# Patient Record
Sex: MALE | Race: WHITE | Employment: FULL TIME | URBAN - METROPOLITAN AREA
[De-identification: names, ages, dates, MRNs, and addresses within clinical notes are randomized per-mention and may not be internally consistent; named-entity substitution may affect disease eponyms.]

---

## 2019-03-05 RX ORDER — OXYCODONE AND ACETAMINOPHEN 10; 325 MG/1; MG/1
1 TABLET ORAL EVERY 6 HOURS PRN
COMMUNITY
End: 2019-10-08 | Stop reason: ALTCHOICE

## 2019-03-05 RX ORDER — METHYLPREDNISOLONE 4 MG/1
4 TABLET ORAL DAILY
Status: ON HOLD | COMMUNITY
End: 2019-03-09

## 2019-03-05 RX ORDER — ORPHENADRINE CITRATE 100 MG/1
100 TABLET, EXTENDED RELEASE ORAL
COMMUNITY
End: 2019-10-08 | Stop reason: ALTCHOICE

## 2019-03-05 RX ORDER — GLYBURIDE 2.5 MG/1
2.5 TABLET ORAL
COMMUNITY

## 2019-03-05 RX ORDER — AMLODIPINE BESYLATE 5 MG/1
5 TABLET ORAL DAILY
COMMUNITY

## 2019-03-05 RX ORDER — ATORVASTATIN CALCIUM 80 MG/1
80 TABLET, FILM COATED ORAL NIGHTLY
Status: ON HOLD | COMMUNITY
End: 2019-03-09

## 2019-03-05 RX ORDER — CLOPIDOGREL BISULFATE 75 MG/1
75 TABLET ORAL DAILY
Status: ON HOLD | COMMUNITY
End: 2019-03-09

## 2019-03-05 RX ORDER — IRBESARTAN AND HYDROCHLOROTHIAZIDE 150; 12.5 MG/1; MG/1
1 TABLET, FILM COATED ORAL DAILY
Status: ON HOLD | COMMUNITY
End: 2019-03-09

## 2019-03-05 NOTE — H&P
[address]    PATIENT'S NAME: Mary Campos   ATTENDING PHYSICIAN: Meghan Weber M.D.    PATIENT ACCOUNT#:   [de-identified]    LOCATION:    MEDICAL RECORD #:   [id number]       YOB: 1955  ADMISSION DATE:       03/07/2019    HISTORY AND SIGNS:  Blood pressure 136/70, heart rate 72, respiratory rate 20. HEENT:  Pupils are equal and round. Sclerae are clear. Mouth without lesions. LUNGS:  Clear to auscultation. HEART:  Normal, without a murmur. ABDOMEN:  Soft.   There is no tenderness

## 2019-03-06 ENCOUNTER — APPOINTMENT (OUTPATIENT)
Dept: LAB | Facility: HOSPITAL | Age: 64
End: 2019-03-06
Attending: SURGERY
Payer: COMMERCIAL

## 2019-03-06 ENCOUNTER — LAB ENCOUNTER (OUTPATIENT)
Dept: LAB | Facility: HOSPITAL | Age: 64
End: 2019-03-06
Attending: SURGERY
Payer: COMMERCIAL

## 2019-03-06 ENCOUNTER — HOSPITAL ENCOUNTER (OUTPATIENT)
Dept: ULTRASOUND IMAGING | Facility: HOSPITAL | Age: 64
Discharge: HOME | End: 2019-03-06
Attending: SURGERY
Payer: COMMERCIAL

## 2019-03-06 DIAGNOSIS — I65.29 CAROTID STENOSIS: ICD-10-CM

## 2019-03-06 DIAGNOSIS — Z01.818 PRE-OP TESTING: ICD-10-CM

## 2019-03-06 LAB
ALBUMIN SERPL-MCNC: 3.7 G/DL (ref 3.4–5)
ALBUMIN/GLOB SERPL: 1.1 {RATIO} (ref 1–2)
ALP LIVER SERPL-CCNC: 123 U/L (ref 45–117)
ALT SERPL-CCNC: 30 U/L (ref 16–61)
ANION GAP SERPL CALC-SCNC: 7 MMOL/L (ref 0–18)
ANTIBODY SCREEN: NEGATIVE
APTT PPP: 25.5 SECONDS (ref 26.1–34.6)
AST SERPL-CCNC: 14 U/L (ref 15–37)
ATRIAL RATE: 69 BPM
BASOPHILS # BLD AUTO: 0.01 X10(3) UL (ref 0–0.2)
BASOPHILS NFR BLD AUTO: 0.1 %
BILIRUB SERPL-MCNC: 1.3 MG/DL (ref 0.1–2)
BUN BLD-MCNC: 30 MG/DL (ref 7–18)
BUN/CREAT SERPL: 20.1 (ref 10–20)
CALCIUM BLD-MCNC: 9.6 MG/DL (ref 8.5–10.1)
CHLORIDE SERPL-SCNC: 102 MMOL/L (ref 98–107)
CO2 SERPL-SCNC: 28 MMOL/L (ref 21–32)
CREAT BLD-MCNC: 1.49 MG/DL (ref 0.7–1.3)
DEPRECATED RDW RBC AUTO: 41.6 FL (ref 35.1–46.3)
EOSINOPHIL # BLD AUTO: 0.03 X10(3) UL (ref 0–0.7)
EOSINOPHIL NFR BLD AUTO: 0.3 %
ERYTHROCYTE [DISTWIDTH] IN BLOOD BY AUTOMATED COUNT: 12.7 % (ref 11–15)
GLOBULIN PLAS-MCNC: 3.5 G/DL (ref 2.8–4.4)
GLUCOSE BLD-MCNC: 215 MG/DL (ref 70–99)
HCT VFR BLD AUTO: 44.6 % (ref 39–53)
HGB BLD-MCNC: 15 G/DL (ref 13–17.5)
IMM GRANULOCYTES # BLD AUTO: 0.05 X10(3) UL (ref 0–1)
IMM GRANULOCYTES NFR BLD: 0.5 %
INR BLD: 0.95 (ref 0.9–1.1)
LYMPHOCYTES # BLD AUTO: 1.42 X10(3) UL (ref 1–4)
LYMPHOCYTES NFR BLD AUTO: 13.1 %
M PROTEIN MFR SERPL ELPH: 7.2 G/DL (ref 6.4–8.2)
MCH RBC QN AUTO: 30.1 PG (ref 26–34)
MCHC RBC AUTO-ENTMCNC: 33.6 G/DL (ref 31–37)
MCV RBC AUTO: 89.6 FL (ref 80–100)
MONOCYTES # BLD AUTO: 0.81 X10(3) UL (ref 0.1–1)
MONOCYTES NFR BLD AUTO: 7.5 %
NEUTROPHILS # BLD AUTO: 8.48 X10 (3) UL (ref 1.5–7.7)
NEUTROPHILS # BLD AUTO: 8.48 X10(3) UL (ref 1.5–7.7)
NEUTROPHILS NFR BLD AUTO: 78.5 %
OSMOLALITY SERPL CALC.SUM OF ELEC: 297 MOSM/KG (ref 275–295)
P AXIS: 36 DEGREES
P-R INTERVAL: 154 MS
PLATELET # BLD AUTO: 244 10(3)UL (ref 150–450)
POTASSIUM SERPL-SCNC: 4.5 MMOL/L (ref 3.5–5.1)
PSA SERPL DL<=0.01 NG/ML-MCNC: 13 SECONDS (ref 12.5–14.7)
Q-T INTERVAL: 388 MS
QRS DURATION: 80 MS
QTC CALCULATION (BEZET): 415 MS
R AXIS: -11 DEGREES
RBC # BLD AUTO: 4.98 X10(6)UL (ref 4.3–5.7)
RH BLOOD TYPE: POSITIVE
SODIUM SERPL-SCNC: 137 MMOL/L (ref 136–145)
T AXIS: 11 DEGREES
VENTRICULAR RATE: 69 BPM
WBC # BLD AUTO: 10.8 X10(3) UL (ref 4–11)

## 2019-03-06 PROCEDURE — 93010 ELECTROCARDIOGRAM REPORT: CPT | Performed by: INTERNAL MEDICINE

## 2019-03-06 PROCEDURE — 85025 COMPLETE CBC W/AUTO DIFF WBC: CPT

## 2019-03-06 PROCEDURE — 93880 EXTRACRANIAL BILAT STUDY: CPT | Performed by: SURGERY

## 2019-03-06 PROCEDURE — 80053 COMPREHEN METABOLIC PANEL: CPT

## 2019-03-06 PROCEDURE — 86850 RBC ANTIBODY SCREEN: CPT

## 2019-03-06 PROCEDURE — 36415 COLL VENOUS BLD VENIPUNCTURE: CPT

## 2019-03-06 PROCEDURE — 86900 BLOOD TYPING SEROLOGIC ABO: CPT

## 2019-03-06 PROCEDURE — 93005 ELECTROCARDIOGRAM TRACING: CPT

## 2019-03-06 PROCEDURE — 86901 BLOOD TYPING SEROLOGIC RH(D): CPT

## 2019-03-06 PROCEDURE — 85730 THROMBOPLASTIN TIME PARTIAL: CPT

## 2019-03-06 PROCEDURE — 85610 PROTHROMBIN TIME: CPT

## 2019-03-07 ENCOUNTER — ANESTHESIA EVENT (OUTPATIENT)
Dept: CARDIAC SURGERY | Facility: HOSPITAL | Age: 64
DRG: 039 | End: 2019-03-07
Payer: COMMERCIAL

## 2019-03-07 ENCOUNTER — HOSPITAL ENCOUNTER (INPATIENT)
Facility: HOSPITAL | Age: 64
LOS: 2 days | Discharge: HOME | DRG: 039 | End: 2019-03-09
Attending: SURGERY | Admitting: SURGERY
Payer: COMMERCIAL

## 2019-03-07 ENCOUNTER — ANESTHESIA (OUTPATIENT)
Dept: CARDIAC SURGERY | Facility: HOSPITAL | Age: 64
DRG: 039 | End: 2019-03-07
Payer: COMMERCIAL

## 2019-03-07 PROBLEM — Z98.890 S/P CAROTID ENDARTERECTOMY: Status: ACTIVE | Noted: 2019-03-07

## 2019-03-07 LAB
ANION GAP SERPL CALC-SCNC: 8 MMOL/L (ref 0–18)
BUN BLD-MCNC: 20 MG/DL (ref 7–18)
BUN/CREAT SERPL: 20.6 (ref 10–20)
CALCIUM BLD-MCNC: 7.6 MG/DL (ref 8.5–10.1)
CHLORIDE SERPL-SCNC: 107 MMOL/L (ref 98–107)
CO2 SERPL-SCNC: 25 MMOL/L (ref 21–32)
CREAT BLD-MCNC: 0.97 MG/DL (ref 0.7–1.3)
DEPRECATED RDW RBC AUTO: 40.4 FL (ref 35.1–46.3)
ERYTHROCYTE [DISTWIDTH] IN BLOOD BY AUTOMATED COUNT: 12.4 % (ref 11–15)
EST. AVERAGE GLUCOSE BLD GHB EST-MCNC: 169 MG/DL (ref 68–126)
GLUCOSE BLD-MCNC: 131 MG/DL (ref 70–99)
GLUCOSE BLD-MCNC: 143 MG/DL (ref 70–99)
GLUCOSE BLD-MCNC: 149 MG/DL (ref 70–99)
GLUCOSE BLD-MCNC: 253 MG/DL (ref 70–99)
GLUCOSE BLD-MCNC: 261 MG/DL (ref 70–99)
GLUCOSE BLD-MCNC: 276 MG/DL (ref 70–99)
HBA1C MFR BLD HPLC: 7.5 % (ref ?–5.7)
HBV SURFACE AG SER-ACNC: <0.1 [IU]/L
HBV SURFACE AG SERPL QL IA: NONREACTIVE
HCT VFR BLD AUTO: 35.8 % (ref 39–53)
HCV AB SERPL QL IA: NONREACTIVE
HGB BLD-MCNC: 12.1 G/DL (ref 13–17.5)
MCH RBC QN AUTO: 30 PG (ref 26–34)
MCHC RBC AUTO-ENTMCNC: 33.8 G/DL (ref 31–37)
MCV RBC AUTO: 88.8 FL (ref 80–100)
OSMOLALITY SERPL CALC.SUM OF ELEC: 295 MOSM/KG (ref 275–295)
PLATELET # BLD AUTO: 201 10(3)UL (ref 150–450)
POTASSIUM SERPL-SCNC: 3.7 MMOL/L (ref 3.5–5.1)
RAPID HIV: NONREACTIVE
RBC # BLD AUTO: 4.03 X10(6)UL (ref 4.3–5.7)
SODIUM SERPL-SCNC: 140 MMOL/L (ref 136–145)
WBC # BLD AUTO: 7.8 X10(3) UL (ref 4–11)

## 2019-03-07 PROCEDURE — 07B10ZX EXCISION OF RIGHT NECK LYMPHATIC, OPEN APPROACH, DIAGNOSTIC: ICD-10-PCS | Performed by: SURGERY

## 2019-03-07 PROCEDURE — 03UK07Z SUPPLEMENT RIGHT INTERNAL CAROTID ARTERY WITH AUTOLOGOUS TISSUE SUBSTITUTE, OPEN APPROACH: ICD-10-PCS | Performed by: SURGERY

## 2019-03-07 PROCEDURE — 03CK0ZZ EXTIRPATION OF MATTER FROM RIGHT INTERNAL CAROTID ARTERY, OPEN APPROACH: ICD-10-PCS | Performed by: SURGERY

## 2019-03-07 PROCEDURE — 06BP0ZZ EXCISION OF RIGHT SAPHENOUS VEIN, OPEN APPROACH: ICD-10-PCS | Performed by: SURGERY

## 2019-03-07 PROCEDURE — 99233 SBSQ HOSP IP/OBS HIGH 50: CPT | Performed by: HOSPITALIST

## 2019-03-07 RX ORDER — MEPERIDINE HYDROCHLORIDE 25 MG/ML
12.5 INJECTION INTRAMUSCULAR; INTRAVENOUS; SUBCUTANEOUS AS NEEDED
Status: DISCONTINUED | OUTPATIENT
Start: 2019-03-07 | End: 2019-03-09

## 2019-03-07 RX ORDER — HYDROCODONE BITARTRATE AND ACETAMINOPHEN 5; 325 MG/1; MG/1
2 TABLET ORAL EVERY 4 HOURS PRN
Status: DISCONTINUED | OUTPATIENT
Start: 2019-03-07 | End: 2019-03-09

## 2019-03-07 RX ORDER — ATORVASTATIN CALCIUM 80 MG/1
80 TABLET, FILM COATED ORAL NIGHTLY
Status: DISCONTINUED | OUTPATIENT
Start: 2019-03-07 | End: 2019-03-09

## 2019-03-07 RX ORDER — ASPIRIN 81 MG/1
81 TABLET, CHEWABLE ORAL DAILY
Status: DISCONTINUED | OUTPATIENT
Start: 2019-03-07 | End: 2019-03-09

## 2019-03-07 RX ORDER — BISACODYL 10 MG
10 SUPPOSITORY, RECTAL RECTAL
Status: DISCONTINUED | OUTPATIENT
Start: 2019-03-07 | End: 2019-03-09

## 2019-03-07 RX ORDER — MIDAZOLAM HYDROCHLORIDE 1 MG/ML
1 INJECTION INTRAMUSCULAR; INTRAVENOUS EVERY 5 MIN PRN
Status: ACTIVE | OUTPATIENT
Start: 2019-03-07 | End: 2019-03-07

## 2019-03-07 RX ORDER — SODIUM CHLORIDE, SODIUM LACTATE, POTASSIUM CHLORIDE, CALCIUM CHLORIDE 600; 310; 30; 20 MG/100ML; MG/100ML; MG/100ML; MG/100ML
INJECTION, SOLUTION INTRAVENOUS CONTINUOUS
Status: DISCONTINUED | OUTPATIENT
Start: 2019-03-07 | End: 2019-03-07

## 2019-03-07 RX ORDER — MORPHINE SULFATE 4 MG/ML
2 INJECTION, SOLUTION INTRAMUSCULAR; INTRAVENOUS EVERY 2 HOUR PRN
Status: DISCONTINUED | OUTPATIENT
Start: 2019-03-07 | End: 2019-03-09

## 2019-03-07 RX ORDER — IRBESARTAN AND HYDROCHLOROTHIAZIDE 150; 12.5 MG/1; MG/1
1 TABLET, FILM COATED ORAL DAILY
Status: DISCONTINUED | OUTPATIENT
Start: 2019-03-07 | End: 2019-03-07 | Stop reason: SDUPTHER

## 2019-03-07 RX ORDER — CEFAZOLIN SODIUM/WATER 2 G/20 ML
2 SYRINGE (ML) INTRAVENOUS EVERY 8 HOURS
Status: COMPLETED | OUTPATIENT
Start: 2019-03-07 | End: 2019-03-08

## 2019-03-07 RX ORDER — HYDROCODONE BITARTRATE AND ACETAMINOPHEN 5; 325 MG/1; MG/1
1 TABLET ORAL EVERY 4 HOURS PRN
Status: DISCONTINUED | OUTPATIENT
Start: 2019-03-07 | End: 2019-03-09

## 2019-03-07 RX ORDER — CLOPIDOGREL BISULFATE 75 MG/1
75 TABLET ORAL DAILY
Status: DISCONTINUED | OUTPATIENT
Start: 2019-03-07 | End: 2019-03-07

## 2019-03-07 RX ORDER — NITROGLYCERIN 20 MG/100ML
INJECTION INTRAVENOUS CONTINUOUS
Status: DISCONTINUED | OUTPATIENT
Start: 2019-03-07 | End: 2019-03-09

## 2019-03-07 RX ORDER — ACETAMINOPHEN 500 MG
1000 TABLET ORAL ONCE AS NEEDED
Status: COMPLETED | OUTPATIENT
Start: 2019-03-07 | End: 2019-03-07

## 2019-03-07 RX ORDER — DEXTROSE MONOHYDRATE 25 G/50ML
50 INJECTION, SOLUTION INTRAVENOUS
Status: DISCONTINUED | OUTPATIENT
Start: 2019-03-07 | End: 2019-03-09

## 2019-03-07 RX ORDER — HYDROMORPHONE HYDROCHLORIDE 1 MG/ML
0.4 INJECTION, SOLUTION INTRAMUSCULAR; INTRAVENOUS; SUBCUTANEOUS EVERY 5 MIN PRN
Status: ACTIVE | OUTPATIENT
Start: 2019-03-07 | End: 2019-03-07

## 2019-03-07 RX ORDER — ASPIRIN 81 MG/1
81 TABLET, CHEWABLE ORAL DAILY
Status: DISCONTINUED | OUTPATIENT
Start: 2019-03-07 | End: 2019-03-07

## 2019-03-07 RX ORDER — DEXAMETHASONE SODIUM PHOSPHATE 4 MG/ML
4 VIAL (ML) INJECTION AS NEEDED
Status: ACTIVE | OUTPATIENT
Start: 2019-03-07 | End: 2019-03-07

## 2019-03-07 RX ORDER — CLOPIDOGREL BISULFATE 75 MG/1
75 TABLET ORAL DAILY
Status: DISCONTINUED | OUTPATIENT
Start: 2019-03-07 | End: 2019-03-09

## 2019-03-07 RX ORDER — MORPHINE SULFATE 4 MG/ML
1 INJECTION, SOLUTION INTRAMUSCULAR; INTRAVENOUS EVERY 2 HOUR PRN
Status: DISCONTINUED | OUTPATIENT
Start: 2019-03-07 | End: 2019-03-09

## 2019-03-07 RX ORDER — AMLODIPINE BESYLATE 5 MG/1
5 TABLET ORAL DAILY
Status: DISCONTINUED | OUTPATIENT
Start: 2019-03-08 | End: 2019-03-08

## 2019-03-07 RX ORDER — SODIUM CHLORIDE, SODIUM LACTATE, POTASSIUM CHLORIDE, CALCIUM CHLORIDE 600; 310; 30; 20 MG/100ML; MG/100ML; MG/100ML; MG/100ML
INJECTION, SOLUTION INTRAVENOUS CONTINUOUS
Status: DISCONTINUED | OUTPATIENT
Start: 2019-03-07 | End: 2019-03-09

## 2019-03-07 RX ORDER — DOCUSATE SODIUM 100 MG/1
100 CAPSULE, LIQUID FILLED ORAL 2 TIMES DAILY
Status: DISCONTINUED | OUTPATIENT
Start: 2019-03-07 | End: 2019-03-09

## 2019-03-07 RX ORDER — ONDANSETRON 2 MG/ML
4 INJECTION INTRAMUSCULAR; INTRAVENOUS EVERY 6 HOURS PRN
Status: DISCONTINUED | OUTPATIENT
Start: 2019-03-07 | End: 2019-03-09

## 2019-03-07 RX ORDER — METHYLPREDNISOLONE 4 MG/1
4 TABLET ORAL DAILY
Status: DISCONTINUED | OUTPATIENT
Start: 2019-03-07 | End: 2019-03-07

## 2019-03-07 RX ORDER — ONDANSETRON 2 MG/ML
4 INJECTION INTRAMUSCULAR; INTRAVENOUS AS NEEDED
Status: ACTIVE | OUTPATIENT
Start: 2019-03-07 | End: 2019-03-07

## 2019-03-07 RX ORDER — NALOXONE HYDROCHLORIDE 0.4 MG/ML
80 INJECTION, SOLUTION INTRAMUSCULAR; INTRAVENOUS; SUBCUTANEOUS AS NEEDED
Status: ACTIVE | OUTPATIENT
Start: 2019-03-07 | End: 2019-03-07

## 2019-03-07 RX ORDER — MORPHINE SULFATE 4 MG/ML
4 INJECTION, SOLUTION INTRAMUSCULAR; INTRAVENOUS EVERY 2 HOUR PRN
Status: DISCONTINUED | OUTPATIENT
Start: 2019-03-07 | End: 2019-03-09

## 2019-03-07 RX ORDER — ACETAMINOPHEN 325 MG/1
650 TABLET ORAL EVERY 4 HOURS PRN
Status: DISCONTINUED | OUTPATIENT
Start: 2019-03-07 | End: 2019-03-09

## 2019-03-07 NOTE — ANESTHESIA PREPROCEDURE EVALUATION
PRE-OP EVALUATION    Patient Name: Sim Hernandez    Pre-op Diagnosis: RIGHT CEREBRAL VASCULAR ACCIDENT, STROKE    Procedure(s):  right carotid endarterectomy with vein patch. harvesting of right saphenous vein.      Surgeon(s) and Role:     * Vaishnavi Rodriguez Intravenous PRN       Outpatient Medications:    Clopidogrel Bisulfate 75 MG Oral Tab Take 75 mg by mouth daily. Disp:  Rfl:    aspirin 81 MG Oral Tab Take 81 mg by mouth daily.  Disp:  Rfl:    SITagliptin-metFORMIN HCl ER (JANUMET XR) 100-1000 MG Oral Tabl HCT 44.6 03/06/2019    MCV 89.6 03/06/2019    MCH 30.1 03/06/2019    MCHC 33.6 03/06/2019    RDW 12.7 03/06/2019    .0 03/06/2019     Lab Results   Component Value Date     03/06/2019    K 4.5 03/06/2019     03/06/2019    CO2 28.0 03/06/

## 2019-03-07 NOTE — PLAN OF CARE
Assumed care of pt @ 1045 from [place] [address]. Pts neck dressing with shadow drainage, leg incision open to air, skin glue, no drainage. Neurologically intact, no facial droop. A&Ox4. Neck incision oozing slowly, Dr. Victoria Pacheco aware.  Dressing changed by Dr. Victoria Pacheco at be

## 2019-03-07 NOTE — CONSULTS
[facility]  CONSULT     Sim Mary Patient Status:  Inpatient    3/12/1955 MRN [id number]   [facility] 6NE-A Attending Darlene Harper MD   Hosp Day # 0 PCP Nicolle Early     Reason for consult: DM   Requested by: Dr. Dina Vanegas  MG Oral Tab Take 1 tablet by mouth every 6 (six) hours as needed for Pain. Disp:  Rfl:    methylPREDNISolone 4 MG Oral Tab Take 4 mg by mouth daily.  Disp:  Rfl:        Physical Exam:    /71   Pulse 58   Temp 97.1 °F (36.2 °C) (Temporal)   Resp full  Plan of care discussed with patient   Jeana Zepeda MD  3/7/2019

## 2019-03-07 NOTE — OPERATIVE REPORT
Pre-op Dx: Bilateral carotid stenosis- TIA right side. Post-op Dx: Same with subplaque hemorrhage. Procedure: Right CEA/vein patch/Romeo shunt.  Surgeon : Eulogio/ Asst: Petey Beard

## 2019-03-07 NOTE — OPERATIVE REPORT
[facility]    PATIENT'S NAME: Stewart Paul   ATTENDING PHYSICIAN: Terry Ellington M.D. OPERATING PHYSICIAN: Terry Ellington M.D.    PATIENT ACCOUNT#:   [de-identified]    LOCATION:  [facility] 1 ED  MEDICAL RECORD #:   [id number]       DATE OF BIRTH was used to cover the operative field. The patient had ultrasound done prior to procedure to demonstrate the saphenous vein.   The patient, as previously stated, received preoperative antibiotics, and Tim catheter was placed by Surgery, art line and IV b through a severely stenotic ulcerative sub-plaque hemorrhage, into the internal carotid beyond; there was greater than 50% stenosis by visual imaging.   The patient had a heparinized-filled Romeo shunt inserted first distally, with adequate backbleeding, an thyroidal artery. Vital signs remained stable. Once hemostasis was obtained, the wounds were closed with 2-0 Vicryl in the platysma, 3-0 Vicryl subcuticular. The area was anesthetized with approximately 20 mL of 0.25% Marcaine plain.   Estimated blood lo

## 2019-03-07 NOTE — ANESTHESIA POSTPROCEDURE EVALUATION
[address] Patient Status:  Inpatient   Age/Gender 66year old male MRN [id number]   [facility] 6NE-A Attending Radha Jason MD   Hosp Day # 0 PCP VERO BRADLEY       Anesthesia Post-op Note    Procedure(s):  ri

## 2019-03-07 NOTE — CONSULTS
[facility]  Cardiology Consultation    Roslynn Nyhan Patient Status:  Inpatient    3/12/1955 MRN [id number]   [facility] 6NE-A Attending Judy Britt MD   Hosp Day # 0 PCP Mary Lopez     Reason for Consultation: Q2H PRN  •  aspirin chewable tab 81 mg, 81 mg, Oral, Daily  •  docusate sodium (COLACE) cap 100 mg, 100 mg, Oral, BID  •  bisacodyl (DULCOLAX) rectal suppository 10 mg, 10 mg, Rectal, Daily PRN  •  ceFAZolin sodium (ANCEF/KEFZOL) 2 GM/20ML premix IV syring review of systems was negative if not otherwise mention in above HPI.     /60 (BP Location: Right arm)   Pulse 55   Temp 98.2 °F (36.8 °C) (Temporal)   Resp 19   Ht 6' (1.829 m)   Wt 201 lb 3 oz (91.3 kg)   SpO2 93%   BMI 27.29 kg/m²   Temp (24hrs), A patient.     Wero Godinez MD  3/7/2019  5:49 PM

## 2019-03-08 LAB
ANION GAP SERPL CALC-SCNC: 5 MMOL/L (ref 0–18)
ATRIAL RATE: 71 BPM
BUN BLD-MCNC: 15 MG/DL (ref 7–18)
BUN/CREAT SERPL: 18.8 (ref 10–20)
CALCIUM BLD-MCNC: 7.8 MG/DL (ref 8.5–10.1)
CHLORIDE SERPL-SCNC: 107 MMOL/L (ref 98–107)
CO2 SERPL-SCNC: 27 MMOL/L (ref 21–32)
CREAT BLD-MCNC: 0.8 MG/DL (ref 0.7–1.3)
DEPRECATED RDW RBC AUTO: 41.1 FL (ref 35.1–46.3)
ERYTHROCYTE [DISTWIDTH] IN BLOOD BY AUTOMATED COUNT: 12.5 % (ref 11–15)
GLUCOSE BLD-MCNC: 136 MG/DL (ref 70–99)
GLUCOSE BLD-MCNC: 154 MG/DL (ref 70–99)
GLUCOSE BLD-MCNC: 163 MG/DL (ref 70–99)
GLUCOSE BLD-MCNC: 177 MG/DL (ref 70–99)
GLUCOSE BLD-MCNC: 192 MG/DL (ref 70–99)
GLUCOSE BLD-MCNC: 226 MG/DL (ref 70–99)
HCT VFR BLD AUTO: 35.1 % (ref 39–53)
HGB BLD-MCNC: 11.7 G/DL (ref 13–17.5)
MCH RBC QN AUTO: 29.7 PG (ref 26–34)
MCHC RBC AUTO-ENTMCNC: 33.3 G/DL (ref 31–37)
MCV RBC AUTO: 89.1 FL (ref 80–100)
OSMOLALITY SERPL CALC.SUM OF ELEC: 292 MOSM/KG (ref 275–295)
P AXIS: 48 DEGREES
P-R INTERVAL: 168 MS
PLATELET # BLD AUTO: 192 10(3)UL (ref 150–450)
POTASSIUM SERPL-SCNC: 3.8 MMOL/L (ref 3.5–5.1)
Q-T INTERVAL: 412 MS
QRS DURATION: 82 MS
QTC CALCULATION (BEZET): 447 MS
R AXIS: 5 DEGREES
RBC # BLD AUTO: 3.94 X10(6)UL (ref 4.3–5.7)
SODIUM SERPL-SCNC: 139 MMOL/L (ref 136–145)
T AXIS: -1 DEGREES
VENTRICULAR RATE: 71 BPM
WBC # BLD AUTO: 11.2 X10(3) UL (ref 4–11)

## 2019-03-08 PROCEDURE — 99232 SBSQ HOSP IP/OBS MODERATE 35: CPT | Performed by: HOSPITALIST

## 2019-03-08 RX ORDER — POTASSIUM CHLORIDE 20 MEQ/1
40 TABLET, EXTENDED RELEASE ORAL ONCE
Status: COMPLETED | OUTPATIENT
Start: 2019-03-08 | End: 2019-03-08

## 2019-03-08 NOTE — PROGRESS NOTES
[facility]  Progress Note     Roslynn Nyhan Patient Status:  Inpatient    3/12/1955 MRN [id number]   [facility] 7NE-A Attending Judy Britt MD   Hosp Day # 1 PCP Mary Lopez     Chief Complaint: CEA  S:  Patient denie mg Oral BID   • atorvastatin  80 mg Oral Nightly   • Clopidogrel Bisulfate  75 mg Oral Daily   • Insulin Aspart Pen  1-10 Units Subcutaneous TID AC and HS     ASSESSMENT / PLAN:   1. S/p CEA  1. Per Dr. Yanique Cotto  2. ASA, plavix   3. Neurologically intact   2.

## 2019-03-08 NOTE — PROGRESS NOTES
[facility]  Progress Note    Lindsey Garcia Patient Status:  Inpatient    3/12/1955 MRN [id number]   [facility] 6NE-A Attending Julian Cifuentes MD   Hosp Day # 1 PCP VERO BRALDEY       Subjective:  Up in chair eating breakfast. only)   Oral Daily   • Insulin Aspart Pen  1-10 Units Subcutaneous TID AC and HS     • Nitroglycerin in D5W     • lactated ringers 125 mL/hr at 03/07/19 2011       Assessment:  · POD 1 s/p right CEA 3/7/19  · TIA  · HTN-BP marginal, hold ARB/hctz today  ·

## 2019-03-08 NOTE — PROGRESS NOTES
No complaints. Neurologically intact. Wounds clean/dry. Blood pressure stable- p0lan discharge tomorrow.  Discussed wound care/activity/ follow up

## 2019-03-08 NOTE — PROGRESS NOTES
Transferred from CCU.    Vital signs stable   Dressing to right neck C/D/I- changed x1 for scant oozing   Pain to right groin slightly improved with [address] for DC tomorrow

## 2019-03-09 VITALS
SYSTOLIC BLOOD PRESSURE: 148 MMHG | WEIGHT: 205.5 LBS | HEART RATE: 77 BPM | OXYGEN SATURATION: 97 % | BODY MASS INDEX: 27.83 KG/M2 | RESPIRATION RATE: 16 BRPM | TEMPERATURE: 98 F | DIASTOLIC BLOOD PRESSURE: 72 MMHG | HEIGHT: 72 IN

## 2019-03-09 LAB
GLUCOSE BLD-MCNC: 132 MG/DL (ref 70–99)
POTASSIUM SERPL-SCNC: 4.3 MMOL/L (ref 3.5–5.1)

## 2019-03-09 PROCEDURE — 99232 SBSQ HOSP IP/OBS MODERATE 35: CPT | Performed by: HOSPITALIST

## 2019-03-09 RX ORDER — CLOPIDOGREL BISULFATE 75 MG/1
75 TABLET ORAL DAILY
Qty: 30 TABLET | Refills: 0 | Status: SHIPPED | OUTPATIENT
Start: 2019-03-09

## 2019-03-09 RX ORDER — ATORVASTATIN CALCIUM 80 MG/1
80 TABLET, FILM COATED ORAL NIGHTLY
Qty: 30 TABLET | Refills: 0 | Status: SHIPPED | OUTPATIENT
Start: 2019-03-09

## 2019-03-09 NOTE — PLAN OF CARE
NURSING DISCHARGE NOTE    Discharged Home via Wheelchair. Accompanied by Friend  Belongings Taken by patient/family.       Patient educated on medications, s/s to monitor for, follow up appointments, wound care, discharge instructions per kaia Hatfield

## 2019-03-09 NOTE — PROGRESS NOTES
No complaints- neurologically intact, wound clean/dry. Instructed post-op wound care/activity/ follow up.

## 2019-03-09 NOTE — PROGRESS NOTES
[facility]  Progress Note     Caesar Shira Patient Status:  Inpatient    3/12/1955 MRN [id number]   [facility] 7NE-A Attending Itzel Camarillo MD   Hosp Day # 2 PCP Sandra Douglas     Chief Complaint: CEA  S:  Patient denie Epic.  Medications:   • aspirin  81 mg Oral Daily   • docusate sodium  100 mg Oral BID   • atorvastatin  80 mg Oral Nightly   • Clopidogrel Bisulfate  75 mg Oral Daily   • Insulin Aspart Pen  1-10 Units Subcutaneous TID AC and HS     ASSESSMENT / PLAN:   1

## 2019-03-09 NOTE — PLAN OF CARE
CARDIOVASCULAR - ADULT    • Maintains optimal cardiac output and hemodynamic stability Adequate for Discharge    • Absence of cardiac arrhythmias or at baseline Adequate for Discharge        Diabetes/Glucose Control    • Glucose maintained within presc

## 2019-03-09 NOTE — PLAN OF CARE
Assumed pt care at 2330. Pt resting in bed in no apparent distress. Rt neck slightly swollen w/ drsg in place c/d/i. Medicated for pain w/Norco prn with adequate relief.VSS. SBP maintained within set parematers. IVF infusing as ordered. All safety precautions

## 2019-03-09 NOTE — PLAN OF CARE
Assumed care of patient at 1900. Alert and oriented x4. Pain to right neck and right leg incisions, norco provided as ordered. Dressing CDI. Room air with clear lung sounds, SR on tele. IVF infusing as ordered. Call light within reach.  Report endorsed to n

## 2019-03-09 NOTE — PROGRESS NOTES
[facility] Cardiology Progress Note    Patient seen and examined.  Chart reviewed.  Discussed with family at bedside. No chest pain or shortness of breath. Mild discomfort at surgical site/jaw. /76 (BP Location: Left arm)   Pulse 55   Temp 98 °F (36. MG/ML injection 0.4 mg 0.4 mg Intravenous Q5 Min PRN   [] Atropine Sulfate 0.1 MG/ML injection 0.5 mg 0.5 mg Intravenous PRN   [] Naloxone HCl (NARCAN) 0.4 MG/ML injection 80 mcg 80 mcg Intravenous PRN   [COMPLETED] acetaminophen (TYLENOL EXT

## 2019-03-11 LAB — BLOOD TYPE BARCODE: 7300

## 2019-03-11 NOTE — DISCHARGE SUMMARY
[address]    PATIENT'S NAME: Nory Means   ATTENDING PHYSICIAN: Pierre Mcardle, M.D.    PATIENT ACCOUNT#:   [de-identified]    LOCATION:  [address] [place] [address]  MEDICAL RECORD #:   [id number]       YOB: 1955  ADMISSION DATE:       03/07/20

## 2019-03-11 NOTE — DISCHARGE SUMMARY
[address]    PATIENT'S NAME: Jennifer Clayreyes   ATTENDING PHYSICIAN: Emile Harper M.D.    PATIENT ACCOUNT#:   [de-identified]    LOCATION:  [address] [place] [address]  MEDICAL RECORD #:   [id number]       YOB: 1955  ADMISSION DATE:       03/07/20

## 2019-03-19 DIAGNOSIS — Z98.890 HISTORY OF CEA (CAROTID ENDARTERECTOMY): Primary | ICD-10-CM

## 2019-03-19 DIAGNOSIS — I65.21 OCCLUSION OF RIGHT CAROTID ARTERY: ICD-10-CM

## 2019-03-25 ENCOUNTER — HOSPITAL ENCOUNTER (OUTPATIENT)
Dept: CARDIOLOGY CLINIC | Facility: HOSPITAL | Age: 64
Discharge: HOME | End: 2019-03-25
Attending: SURGERY

## 2019-03-25 DIAGNOSIS — I65.21 ARTERIOSCLEROSIS OF RIGHT CAROTID ARTERY: ICD-10-CM

## 2019-03-25 DIAGNOSIS — Z98.890 HISTORY OF CEA (CAROTID ENDARTERECTOMY): ICD-10-CM

## 2019-03-25 PROCEDURE — 93880 EXTRACRANIAL BILAT STUDY: CPT | Performed by: INTERNAL MEDICINE

## 2019-10-10 NOTE — H&P
[address]    PATIENT'S NAME: Irvin Reese   ATTENDING PHYSICIAN: Modesto Bailey M.D.    PATIENT ACCOUNT#:   [de-identified]    LOCATION:    MEDICAL RECORD #:   [id number]       YOB: 1955  ADMISSION DATE:       10/14/2019    HISTORY A hematemesis. No bright red blood per rectum. No hematuria, dysuria, hemoptysis, cough, or sputum production. No weight loss. No fever, chills. PHYSICAL EXAMINATION:    GENERAL:  He is awake, alert. He is appropriate. He is in no acute distress. M.D.  d: 10/10/2019 00:58:23  t: 10/10/2019 05:01:25  [facility] [id number]  DDY/

## 2019-10-11 ENCOUNTER — APPOINTMENT (OUTPATIENT)
Dept: LAB | Facility: HOSPITAL | Age: 64
End: 2019-10-11
Attending: SURGERY
Payer: COMMERCIAL

## 2019-10-11 ENCOUNTER — LAB ENCOUNTER (OUTPATIENT)
Dept: LAB | Facility: HOSPITAL | Age: 64
End: 2019-10-11
Attending: SURGERY
Payer: COMMERCIAL

## 2019-10-11 DIAGNOSIS — Z01.818 PRE-OP TESTING: ICD-10-CM

## 2019-10-11 PROCEDURE — 87081 CULTURE SCREEN ONLY: CPT

## 2019-10-11 PROCEDURE — 80053 COMPREHEN METABOLIC PANEL: CPT

## 2019-10-11 PROCEDURE — 86900 BLOOD TYPING SEROLOGIC ABO: CPT

## 2019-10-11 PROCEDURE — 85025 COMPLETE CBC W/AUTO DIFF WBC: CPT

## 2019-10-11 PROCEDURE — 93010 ELECTROCARDIOGRAM REPORT: CPT | Performed by: INTERNAL MEDICINE

## 2019-10-11 PROCEDURE — 85730 THROMBOPLASTIN TIME PARTIAL: CPT

## 2019-10-11 PROCEDURE — 86850 RBC ANTIBODY SCREEN: CPT

## 2019-10-11 PROCEDURE — 93005 ELECTROCARDIOGRAM TRACING: CPT

## 2019-10-11 PROCEDURE — 85610 PROTHROMBIN TIME: CPT

## 2019-10-11 PROCEDURE — 86901 BLOOD TYPING SEROLOGIC RH(D): CPT

## 2019-10-11 PROCEDURE — 36415 COLL VENOUS BLD VENIPUNCTURE: CPT

## 2019-10-14 ENCOUNTER — ANESTHESIA (OUTPATIENT)
Dept: CARDIAC SURGERY | Facility: HOSPITAL | Age: 64
DRG: 038 | End: 2019-10-14
Payer: COMMERCIAL

## 2019-10-14 ENCOUNTER — HOSPITAL ENCOUNTER (INPATIENT)
Facility: HOSPITAL | Age: 64
LOS: 1 days | Discharge: HOME | DRG: 038 | End: 2019-10-15
Attending: SURGERY | Admitting: SURGERY
Payer: COMMERCIAL

## 2019-10-14 ENCOUNTER — ANESTHESIA EVENT (OUTPATIENT)
Dept: CARDIAC SURGERY | Facility: HOSPITAL | Age: 64
DRG: 038 | End: 2019-10-14
Payer: COMMERCIAL

## 2019-10-14 PROCEDURE — 03UN07Z SUPPLEMENT LEFT EXTERNAL CAROTID ARTERY WITH AUTOLOGOUS TISSUE SUBSTITUTE, OPEN APPROACH: ICD-10-PCS | Performed by: SURGERY

## 2019-10-14 PROCEDURE — 99254 IP/OBS CNSLTJ NEW/EST MOD 60: CPT | Performed by: INTERNAL MEDICINE

## 2019-10-14 PROCEDURE — 03CL0ZZ EXTIRPATION OF MATTER FROM LEFT INTERNAL CAROTID ARTERY, OPEN APPROACH: ICD-10-PCS | Performed by: SURGERY

## 2019-10-14 PROCEDURE — 03CN0ZZ EXTIRPATION OF MATTER FROM LEFT EXTERNAL CAROTID ARTERY, OPEN APPROACH: ICD-10-PCS | Performed by: SURGERY

## 2019-10-14 PROCEDURE — 07B20ZZ EXCISION OF LEFT NECK LYMPHATIC, OPEN APPROACH: ICD-10-PCS | Performed by: SURGERY

## 2019-10-14 PROCEDURE — 99253 IP/OBS CNSLTJ NEW/EST LOW 45: CPT | Performed by: INTERNAL MEDICINE

## 2019-10-14 PROCEDURE — 03UL07Z SUPPLEMENT LEFT INTERNAL CAROTID ARTERY WITH AUTOLOGOUS TISSUE SUBSTITUTE, OPEN APPROACH: ICD-10-PCS | Performed by: SURGERY

## 2019-10-14 RX ORDER — MORPHINE SULFATE 2 MG/ML
1 INJECTION, SOLUTION INTRAMUSCULAR; INTRAVENOUS EVERY 2 HOUR PRN
Status: DISCONTINUED | OUTPATIENT
Start: 2019-10-14 | End: 2019-10-15

## 2019-10-14 RX ORDER — HYDROMORPHONE HYDROCHLORIDE 1 MG/ML
0.4 INJECTION, SOLUTION INTRAMUSCULAR; INTRAVENOUS; SUBCUTANEOUS EVERY 5 MIN PRN
Status: ACTIVE | OUTPATIENT
Start: 2019-10-14 | End: 2019-10-14

## 2019-10-14 RX ORDER — ASPIRIN 81 MG/1
81 TABLET ORAL DAILY
Status: DISCONTINUED | OUTPATIENT
Start: 2019-10-14 | End: 2019-10-15

## 2019-10-14 RX ORDER — CEFAZOLIN SODIUM/WATER 2 G/20 ML
2 SYRINGE (ML) INTRAVENOUS EVERY 8 HOURS
Status: COMPLETED | OUTPATIENT
Start: 2019-10-14 | End: 2019-10-15

## 2019-10-14 RX ORDER — SODIUM CHLORIDE, SODIUM LACTATE, POTASSIUM CHLORIDE, CALCIUM CHLORIDE 600; 310; 30; 20 MG/100ML; MG/100ML; MG/100ML; MG/100ML
INJECTION, SOLUTION INTRAVENOUS CONTINUOUS
Status: DISCONTINUED | OUTPATIENT
Start: 2019-10-14 | End: 2019-10-15

## 2019-10-14 RX ORDER — CEFAZOLIN SODIUM/WATER 2 G/20 ML
SYRINGE (ML) INTRAVENOUS
Status: DISCONTINUED | OUTPATIENT
Start: 2019-10-14 | End: 2019-10-14 | Stop reason: HOSPADM

## 2019-10-14 RX ORDER — DOCUSATE SODIUM 100 MG/1
100 CAPSULE, LIQUID FILLED ORAL 2 TIMES DAILY
Status: DISCONTINUED | OUTPATIENT
Start: 2019-10-14 | End: 2019-10-15

## 2019-10-14 RX ORDER — BISACODYL 10 MG
10 SUPPOSITORY, RECTAL RECTAL
Status: DISCONTINUED | OUTPATIENT
Start: 2019-10-14 | End: 2019-10-15

## 2019-10-14 RX ORDER — ONDANSETRON 2 MG/ML
4 INJECTION INTRAMUSCULAR; INTRAVENOUS EVERY 6 HOURS PRN
Status: DISCONTINUED | OUTPATIENT
Start: 2019-10-14 | End: 2019-10-15

## 2019-10-14 RX ORDER — METOCLOPRAMIDE HYDROCHLORIDE 5 MG/ML
10 INJECTION INTRAMUSCULAR; INTRAVENOUS AS NEEDED
Status: ACTIVE | OUTPATIENT
Start: 2019-10-14 | End: 2019-10-14

## 2019-10-14 RX ORDER — MORPHINE SULFATE 2 MG/ML
4 INJECTION, SOLUTION INTRAMUSCULAR; INTRAVENOUS EVERY 2 HOUR PRN
Status: DISCONTINUED | OUTPATIENT
Start: 2019-10-14 | End: 2019-10-15

## 2019-10-14 RX ORDER — HYDROCODONE BITARTRATE AND ACETAMINOPHEN 5; 325 MG/1; MG/1
1 TABLET ORAL EVERY 4 HOURS PRN
Status: DISCONTINUED | OUTPATIENT
Start: 2019-10-14 | End: 2019-10-15

## 2019-10-14 RX ORDER — ACETAMINOPHEN 325 MG/1
650 TABLET ORAL EVERY 4 HOURS PRN
Status: DISCONTINUED | OUTPATIENT
Start: 2019-10-14 | End: 2019-10-15

## 2019-10-14 RX ORDER — ATORVASTATIN CALCIUM 80 MG/1
80 TABLET, FILM COATED ORAL NIGHTLY
Status: DISCONTINUED | OUTPATIENT
Start: 2019-10-14 | End: 2019-10-15

## 2019-10-14 RX ORDER — CLOPIDOGREL BISULFATE 75 MG/1
75 TABLET ORAL DAILY
Status: DISCONTINUED | OUTPATIENT
Start: 2019-10-14 | End: 2019-10-14

## 2019-10-14 RX ORDER — AMLODIPINE BESYLATE 5 MG/1
5 TABLET ORAL DAILY
Status: DISCONTINUED | OUTPATIENT
Start: 2019-10-15 | End: 2019-10-15

## 2019-10-14 RX ORDER — MEPERIDINE HYDROCHLORIDE 25 MG/ML
12.5 INJECTION INTRAMUSCULAR; INTRAVENOUS; SUBCUTANEOUS AS NEEDED
Status: DISCONTINUED | OUTPATIENT
Start: 2019-10-14 | End: 2019-10-15

## 2019-10-14 RX ORDER — CLOPIDOGREL BISULFATE 75 MG/1
75 TABLET ORAL DAILY
Status: DISCONTINUED | OUTPATIENT
Start: 2019-10-14 | End: 2019-10-15

## 2019-10-14 RX ORDER — DEXTROSE MONOHYDRATE 25 G/50ML
50 INJECTION, SOLUTION INTRAVENOUS
Status: DISCONTINUED | OUTPATIENT
Start: 2019-10-14 | End: 2019-10-15

## 2019-10-14 RX ORDER — MORPHINE SULFATE 2 MG/ML
2 INJECTION, SOLUTION INTRAMUSCULAR; INTRAVENOUS EVERY 2 HOUR PRN
Status: DISCONTINUED | OUTPATIENT
Start: 2019-10-14 | End: 2019-10-15

## 2019-10-14 RX ORDER — HYDROCODONE BITARTRATE AND ACETAMINOPHEN 5; 325 MG/1; MG/1
2 TABLET ORAL EVERY 4 HOURS PRN
Status: DISCONTINUED | OUTPATIENT
Start: 2019-10-14 | End: 2019-10-15

## 2019-10-14 RX ORDER — AMOXICILLIN 500 MG/1
500 CAPSULE ORAL EVERY 8 HOURS
Status: DISCONTINUED | OUTPATIENT
Start: 2019-10-15 | End: 2019-10-15

## 2019-10-14 RX ORDER — DEXTROSE MONOHYDRATE 25 G/50ML
50 INJECTION, SOLUTION INTRAVENOUS
Status: DISCONTINUED | OUTPATIENT
Start: 2019-10-14 | End: 2019-10-14

## 2019-10-14 RX ORDER — NALOXONE HYDROCHLORIDE 0.4 MG/ML
80 INJECTION, SOLUTION INTRAMUSCULAR; INTRAVENOUS; SUBCUTANEOUS AS NEEDED
Status: ACTIVE | OUTPATIENT
Start: 2019-10-14 | End: 2019-10-14

## 2019-10-14 RX ORDER — MIDAZOLAM HYDROCHLORIDE 1 MG/ML
1 INJECTION INTRAMUSCULAR; INTRAVENOUS EVERY 5 MIN PRN
Status: ACTIVE | OUTPATIENT
Start: 2019-10-14 | End: 2019-10-14

## 2019-10-14 RX ORDER — NITROGLYCERIN 20 MG/100ML
INJECTION INTRAVENOUS CONTINUOUS
Status: DISCONTINUED | OUTPATIENT
Start: 2019-10-14 | End: 2019-10-15

## 2019-10-14 RX ORDER — BUPIVACAINE HYDROCHLORIDE 5 MG/ML
INJECTION, SOLUTION EPIDURAL; INTRACAUDAL AS NEEDED
Status: DISCONTINUED | OUTPATIENT
Start: 2019-10-14 | End: 2019-10-14 | Stop reason: HOSPADM

## 2019-10-14 RX ORDER — ONDANSETRON 2 MG/ML
4 INJECTION INTRAMUSCULAR; INTRAVENOUS AS NEEDED
Status: ACTIVE | OUTPATIENT
Start: 2019-10-14 | End: 2019-10-14

## 2019-10-14 NOTE — CONSULTS
[facility]  Report of Consultation    Elida Duran Patient Status:  Inpatient    3/12/1955 MRN [id number]   [facility] 6NE-A Attending Brittnee Cho MD   Hosp Day # 0 PCP Shelly Turner     Reason for Consultation:  Cardiac Metoclopramide HCl (REGLAN) injection 10 mg, 10 mg, Intravenous, PRN  •  Meperidine HCl (DEMEROL) 25 MG/ML injection 12.5 mg, 12.5 mg, Intravenous, PRN  •  Midazolam HCl (VERSED) 2 MG/2ML injection 1 mg, 1 mg, Intravenous, Q5 Min PRN  •  nitroGLYCERIN infu Subcutaneous, Nightly  •  Insulin Aspart Pen (NOVOLOG) 100 UNIT/ML flexpen 1-5 Units, 1-5 Units, Subcutaneous, TID CC and HS    Review of Systems:  All systems were reviewed and are negative except as described above in HPI.     Physical Exam:  Blood pressu PM  Patient seen and examined. Note reviewed and labs reviewed. Agree. Last LDL from 2016 was 120 mg/dl. Will need to recheck and considering that patient has been on Atorvastatin 80 mg, rule out familial hypercholesterolemia.   ECG reviewed and normal. F

## 2019-10-14 NOTE — CONSULTS
INFECTIOUS DISEASE CONSULTATION    Lonn Later Patient Status:  Inpatient    3/12/1955 MRN [id number]   [facility] 6NE-A Attending Darlene Harper MD   Hosp Day # 0 PCP Glen BARAJAS 50mg in D5W 250ml, 5-400 mcg/min, Intravenous, Continuous  •  ondansetron HCl (ZOFRAN) injection 4 mg, 4 mg, Intravenous, Q6H PRN  •  acetaminophen (TYLENOL) tab 650 mg, 650 mg, Oral, Q4H PRN **OR** HYDROcodone-acetaminophen (NORCO) 5-325 MG per tab 1 tabl Disp: 30 tablet, Rfl: 0  atorvastatin 80 MG Oral Tab, Take 1 tablet (80 mg total) by mouth nightly., Disp: 30 tablet, Rfl: 0  Clopidogrel Bisulfate 75 MG Oral Tab, Take 1 tablet (75 mg total) by mouth daily. , Disp: 30 tablet, Rfl: 0  SITagliptin-metFORMIN Component Value Date    [facility]             Problem list reviewed:  Patient Active Problem List:     Essential hypertension     Dyslipidemia     Diabetes mellitus type 2 in nonobese (HCC)     S/P carotid endarterectomy      ASSESSMENT/PLAN:

## 2019-10-14 NOTE — ANESTHESIA PREPROCEDURE EVALUATION
PRE-OP EVALUATION    Patient Name: Louise Costello    Pre-op Diagnosis: carotid stenosis    Procedure(s):  left carotid endartectomy with vein patch  [place] Dec    Surgeon(s) and Role:     Aylin Moss MD - Primary    Pre-op vitals reviewed. Frequency: 2-4 times a month      Drinks per session: 1 or 2      Binge frequency: Never      Comment: 1 drink per week      Drug use: No     Available pre-op labs reviewed.   Lab Results   Component Value Date    WBC 6.7 10/11/2019    RBC 4.69 10/11/2019

## 2019-10-14 NOTE — PROGRESS NOTES
Comes in this morning by himself for carotid surgery. No new complaints, just had a tooth pulled with sutures and taking amoxicillin and norco.  Dr. Victoria Pacheco is aware. Neuro intact. Pt had Right carotid done before. Call light in reach.

## 2019-10-14 NOTE — OPERATIVE REPORT
Pre-op Dx: Progressive left carotid stenosis. Post-op Dx: Same with ulcerative lesion and plaque thrombus. Procerdure: Left CEA/ right GSV patch, shunt. Surgeon: Eulogio/ Asst: Radha Berg.  EBL- 300cc/ 1300cc crstalloid

## 2019-10-14 NOTE — CONSULTS
[facility]  CONSULT     Osbaldo Delgado Patient Status:  Inpatient    3/12/1955 MRN [id number]   [facility] 6NE-A Attending Magdy Parikh MD   Hosp Day # 0 PCP Adarsh Magaña     Reason for consult: medical management point review of systems was completed. Pertinent positives and negatives noted in the HPI.     Physical Exam:    BP (!) 146/99   Pulse 84   Temp 97.6 °F (36.4 °C) (Temporal)   Resp 14   Ht 6' (1.829 m)   Wt 209 lb 5 oz (94.9 kg)   SpO2 93%   BMI 28.39 kg Prophylaxis: SCD  · CODE status: full  · Tim: yes post op    Plan of care discussed with patient.      Nikki Kerr MD  10/14/2019

## 2019-10-14 NOTE — ANESTHESIA POSTPROCEDURE EVALUATION
[address] [facility] Patient Status:  Inpatient   Age/Gender 60year old male MRN [id number]   [facility] 6NE-A Attending Isaac Eller MD   Hosp Day # 0 PCP VERO BRADLEY       Anesthesia Post-op Note    Procedure(s):

## 2019-10-15 VITALS
TEMPERATURE: 98 F | HEIGHT: 72 IN | OXYGEN SATURATION: 99 % | WEIGHT: 209.13 LBS | DIASTOLIC BLOOD PRESSURE: 79 MMHG | HEART RATE: 60 BPM | RESPIRATION RATE: 16 BRPM | SYSTOLIC BLOOD PRESSURE: 136 MMHG | BODY MASS INDEX: 28.33 KG/M2

## 2019-10-15 PROCEDURE — 99232 SBSQ HOSP IP/OBS MODERATE 35: CPT | Performed by: INTERNAL MEDICINE

## 2019-10-15 RX ORDER — AMOXICILLIN 500 MG/1
500 CAPSULE ORAL EVERY 8 HOURS
Qty: 9 CAPSULE | Refills: 0 | Status: SHIPPED | OUTPATIENT
Start: 2019-10-15 | End: 2019-10-18

## 2019-10-15 NOTE — PROGRESS NOTES
Patient stable POD 1. He has been cleared by Dr Martha Peterson and medically stable for DC. Med rec completed.      Kelin Borges MD  [facility]

## 2019-10-15 NOTE — PROGRESS NOTES
[facility]  Progress Note    Daniel Peterson Patient Status:  Inpatient    3/12/1955 MRN [id number]   [facility] 6NE-A Attending Kamila Sy MD   Hosp Day # 1 PCP VERO BRADLEY       Subjective:  No acute issues overnight.  P Insulin Aspart Pen  1-5 Units Subcutaneous TID CC and HS   • amoxicillin  500 mg Oral Q8H     • lactated ringers     • Nitroglycerin in D5W Stopped (10/14/19 1415)   • lactated ringers 125 mL/hr at 10/15/19 0400       Assessment:      1.  Carotid artery dis

## 2019-10-15 NOTE — PLAN OF CARE
Assumed care of patient this a.m. @ 8907. Patient is up in chair, A/O x4. VSS. Patient c/o mild pain to left neck incision but declines any pain meds. Dr. Denzel Huddleston @ bedside. Neck and leg dressings removed.  Per Dr. Denzel Huddleston, patient may be D/C'd later this after

## 2019-10-15 NOTE — PLAN OF CARE
Pt received resting in bed. Pt awake, a/o x4. Neurologically intact. Left neck dressing intact. Small amount of serosanguinous drainage noted on dressing. No swelling noted. Pt without difficulty swallowing. No shortness of breath or dyspnea noted.  Blood p

## 2019-10-15 NOTE — OPERATIVE REPORT
[address]    PATIENT'S NAME: Stephanie Sharma   ATTENDING PHYSICIAN: Ariel Padilla M.D. OPERATING PHYSICIAN: Ariel Padilla M.D.    PATIENT ACCOUNT#:   [de-identified]    LOCATION:  [address] [place] [address]  MEDICAL RECORD #:   [id number]       DATE OF BIRTH management versus balloon angioplasty and stent were also explained. Patient also had a recent evaluation by an endodontist and had removal of tooth that was causing a tooth abscess, placed on antibiotics before and after.   He is continued on his Plavix beyond all disease. Common carotid identified deep in the neck proximal to all disease. A Terry retractor had been used for exposure.     The patient again received a bolus of heparin after greater than a 5-minute period of time with the blood pressure areas were irrigated with heparinized saline solution and suctioned clean. The remaining portion of the anastomosis was completed. Backbleeding was allowed from the internal carotid artery to flush out all the air.     With completion of the anastomosis

## 2019-10-17 NOTE — DISCHARGE SUMMARY
[address]    PATIENT'S NAME: Emma Saini   ATTENDING PHYSICIAN: Eugenio Prado M.D.    PATIENT ACCOUNT#:   [de-identified]    LOCATION:  [address] [place] [address]  MEDICAL RECORD #:   [id number]       YOB: 1955  ADMISSION DATE:       10/14/

## 2019-10-30 DIAGNOSIS — Z98.890 S/P CAROTID ENDARTERECTOMY: Primary | ICD-10-CM

## 2019-10-30 DIAGNOSIS — I65.21 STENOSIS OF RIGHT CAROTID ARTERY: ICD-10-CM

## 2019-11-13 ENCOUNTER — HOSPITAL ENCOUNTER (OUTPATIENT)
Dept: CARDIOLOGY CLINIC | Facility: HOSPITAL | Age: 64
Discharge: HOME | End: 2019-11-13
Attending: SURGERY
Payer: COMMERCIAL

## 2019-11-13 DIAGNOSIS — Z98.890 S/P CAROTID ENDARTERECTOMY: ICD-10-CM

## 2019-11-13 DIAGNOSIS — I65.21 STENOSIS OF RIGHT CAROTID ARTERY: ICD-10-CM

## 2019-11-13 PROCEDURE — 93880 EXTRACRANIAL BILAT STUDY: CPT | Performed by: INTERNAL MEDICINE

## 2021-04-24 NOTE — PLAN OF CARE
Glucose maintained within prescribed range Progressing      Incision(s), wounds(s) or drain site(s) healing without S/S of infection Progressing      Assumed care of patient at 7am  OAx4 no neuro deficits noted; does have chonic numbness to both hands   Rt yes

## (undated) DEVICE — STANDARD HYPODERMIC NEEDLE,POLYPROPYLENE HUB: Brand: MONOJECT

## (undated) DEVICE — GAUZE SPONGES,12 PLY: Brand: CURITY

## (undated) DEVICE — 3M™ IOBAN™ 2 ANTIMICROBIAL INCISE DRAPE 6650EZ: Brand: IOBAN™ 2

## (undated) DEVICE — TRANSPOSAL ULTRAFLEX DUO/QUAD ULTRA CART MANIFOLD

## (undated) DEVICE — SOL  .9 1000ML BTL

## (undated) DEVICE — CHLORAPREP ORANGE TINT 10.5ML

## (undated) DEVICE — CATH RED RUBBER 18FR

## (undated) DEVICE — HEMOCLIP MED 24 CLIP/CARTRIDGE

## (undated) DEVICE — BASIC DOUBLE BASIN 1-LF: Brand: MEDLINE INDUSTRIES, INC.

## (undated) DEVICE — SUTURE PROLENE 7-0 BV-1

## (undated) DEVICE — INTENDED TO BE USED TO OCCLUDE, RETRACT AND IDENTIFY ARTERIES, VEINS, TENDONS AND NERVES IN SURGICAL PROCEDURES: Brand: STERION®  VESSEL LOOP

## (undated) DEVICE — PROXIMATE RH ROTATING HEAD SKIN STAPLERS (35 WIDE) CONTAINS 35 STAINLESS STEEL STAPLES: Brand: PROXIMATE

## (undated) DEVICE — MEDI-VAC SUCTION FINE CAPACITY: Brand: CARDINAL HEALTH

## (undated) DEVICE — BARD® JAVID™ CAROTID BYPASS SHUNT, 17F - 10F X 27.5CM: Brand: BARD® JAVID

## (undated) DEVICE — DECANTER BAG 9": Brand: MEDLINE INDUSTRIES, INC.

## (undated) DEVICE — SUTURE VICRYL 3-0 PS-1

## (undated) DEVICE — SKIN AFFIX .4ML

## (undated) DEVICE — ANGIO CATH 16GX2

## (undated) DEVICE — UNDYED BRAIDED (POLYGLACTIN 910), SYNTHETIC ABSORBABLE SUTURE: Brand: COATED VICRYL

## (undated) DEVICE — SUTURE ETHIBOND EXCEL 3-0 SH

## (undated) DEVICE — CV PACK-LF: Brand: MEDLINE INDUSTRIES, INC.

## (undated) DEVICE — SUTURE VICRYL 3-0 CT-1

## (undated) DEVICE — ENCORE® PERRY STYLE 42 PF SIZE 7, STERILE LATEX POWDER-FREE SURGICAL GLOVE: Brand: ENCORE

## (undated) DEVICE — HEMOCLIP HORIZON SM MULTI

## (undated) DEVICE — 3M™ STERI-STRIP™ REINFORCED ADHESIVE SKIN CLOSURES, R1547, 1/2 IN X 4 IN (12 MM X 100 MM), 6 STRIPS/ENVELOPE: Brand: 3M™ STERI-STRIP™

## (undated) DEVICE — SUTURE POLYDEK 4-0 TIES 6-932

## (undated) DEVICE — SYRINGE 10ML LL TIP

## (undated) DEVICE — DRAPE WARMER ORS-100

## (undated) DEVICE — SUTURE VICRYL 2-0 CT-1

## (undated) DEVICE — TOWEL OR BLU 16X26 STRL

## (undated) DEVICE — Device

## (undated) DEVICE — 3M™ TEGADERM™ TRANSPARENT FILM DRESSING, 1626W, 4 IN X 4-3/4 IN (10 CM X 12 CM), 50 EACH/CARTON, 4 CARTON/CASE: Brand: 3M™ TEGADERM™

## (undated) DEVICE — MARKER SKIN 2 TIP

## (undated) DEVICE — ENCORE® PERRY STYLE 42 PF SIZE 7.5, STERILE LATEX POWDER-FREE SURGICAL GLOVE: Brand: ENCORE

## (undated) DEVICE — SUTURE PROLENE 6-0 C-1

## (undated) DEVICE — SUTURE PROLENE 7-0 CC

## (undated) DEVICE — SOL  .9 500ML

## (undated) DEVICE — GEL AQUASONIC 100 20GR

## (undated) NOTE — LETTER
Flordia Haw A. Barbette Lefort, M.D., F.A.C.S. Jesu Otero M.D., F.A.C.S. Darus Gowers M.D., [place]. RODOLFO Daniels M.D., F.A.C.SMyron Whatley. David Camara M.D., F.A.C.S. Janean Cota, M.D. Carline Ferrari, M. Harvy Leach A.D. Danna Cranker, M.D., F. chance to have all of your questions and concerns answered. If there are any issues which have not been adequately addressed, we ask you to bring them forward so that we can thoroughly address them.     A patient who is fully informed and understands their treatment, among other options and the risks and benefits of the different treatment options:    Yes _____ No _____    A [facility] surgeon as explained to me that if I should so desire, he/she is willing to explain my case and the surgical and non-surgical optio

## (undated) NOTE — LETTER
[facility] FOR BLOOD OR BLOOD COMPONENTS      In the course of your treatment, it may become necessary to administer a transfusion of blood or blood components.  This form provides basic information concerning this proc explain the alternatives to you if it has not already been done. I,Robert Carcamo, have read/had read to me the above. I understand the matters bearing on the decision whether or not to authorize a transfusion of blood or blood components.  I have no qu

## (undated) NOTE — LETTER
Ros[address] Gross [address]  [place], [address] [place] [address]    Consent for Operation  Date: __________________                                Time: _______________    1.  I authorize the performance upon Carmen Macias the following operation:    2. le procedure has been videotaped, the surgeon will obtain the original videotape. The hospital will not be responsible for storage or maintenance of this tape.   8. For the purpose of advancing medical education, I consent to the admittance of observers to the STATEMENTS REQUIRING INSERTION OR COMPLETION WERE FILLED IN.     Signature of Patient:   ___________________________    When the patient is a minor or mentally incompetent to give consent:  Signature of person authorized to consent for patient: ____________ supplements, and pills I can buy without a prescription (including street drugs/illegal medications). Failure to inform my anesthesiologist about these medicines may increase my risk of anesthetic complications. iv.  If I am allergic to anything or have ha Anesthesiologist Signature     Date   Time  I have discussed the procedure and information above with the patient (or patient’s representative) and answered their questions. The patient or their representative has agreed to have anesthesia services.     ___

## (undated) NOTE — LETTER
Sondra Hall M.D., F.A.C.S. Idalia Leiva M.D., F.A.C.S. Emma Hammer M.D., [facility]. RODOLFO Resendiz M.D., F.A.C.SMyron Escobar M.D., F.A.C.S. Parish Poole M.D. NIKOS Lauren M.D., F. chance to have all of your questions and concerns answered. If there are any issues which have not been adequately addressed, we ask you to bring them forward so that we can thoroughly address them.     A patient who is fully informed and understands their treatment, among other options and the risks and benefits of the different treatment options:    Yes _____ No _____    A [facility] surgeon as explained to me that if I should so desire, he/she is willing to explain my case and the surgical and non-surgical optio

## (undated) NOTE — LETTER
Ros[address] Gross [address]  [place], [address] [facility]    Consent for Operation  Date: __________________                                Time: _______________    1.  I authorize the performance upon Cristina Olmos the following operation:    2. ri procedure has been videotaped, the surgeon will obtain the original videotape. The hospital will not be responsible for storage or maintenance of this tape.   7. For the purpose of advancing medical education, I consent to the admittance of observers to the STATEMENTS REQUIRING INSERTION OR COMPLETION WERE FILLED IN.     Signature of Patient:   ___________________________    When the patient is a minor or mentally incompetent to give consent:  Signature of person authorized to consent for patient: ____________ supplements, and pills I can buy without a prescription (including street drugs/illegal medications). Failure to inform my anesthesiologist about these medicines may increase my risk of anesthetic complications. iv.  If I am allergic to anything or have ha Anesthesiologist Signature     Date   Time  I have discussed the procedure and information above with the patient (or patient’s representative) and answered their questions. The patient or their representative has agreed to have anesthesia services.     ___

## (undated) NOTE — LETTER
[facility] [facility] FOR BLOOD OR BLOOD COMPONENTS      In the course of your treatment, it may become necessary to administer a transfusion of blood or blood components.  This form provides basic information concerning this proc explain the alternatives to you if it has not already been done. I,Roebrt Carcamo, have read/had read to me the above. I understand the matters bearing on the decision whether or not to authorize a transfusion of blood or blood components.  I have no qu